# Patient Record
(demographics unavailable — no encounter records)

---

## 2024-10-16 NOTE — PHYSICAL EXAM
[No Acute Distress] : no acute distress [Well-Appearing] : well-appearing [Normal Rate] : normal rate  [Regular Rhythm] : with a regular rhythm [Soft] : abdomen soft [No Masses] : no abdominal mass palpated [Normal Bowel Sounds] : normal bowel sounds [de-identified] : RLQ tenderness w/deep palpation, some rebound

## 2024-10-16 NOTE — ASSESSMENT
[FreeTextEntry1] : 73F for acute visit for RLQ pain, hemodynacially stable, exam with +BS x4, mild rebound at RLQ, possible appendix related. Discussed abd US vs CTAP ordered w/contrast. Pt will go to [facility] for ct  If worsening pain, fevers, chills, intractable vomiting, etc, pt will go to ER for eval.  Low Segment Transverse C/S

## 2024-10-16 NOTE — HISTORY OF PRESENT ILLNESS
[FreeTextEntry8] : 73F w/anxiety, gerd, pulm nodule, htn here for acute visit.  Reports pasta, smoked salmon at lunch, but felt a little discomfort in RLQ.  abdominal pain started mid abdomen, throbbing pain, passing very little gas.  Last night very little BM, this morning pain more on RLQ.  No fevers, chills, diarrhea, no nausea, or vomiting. but low appetite. No breakfast today. Spoke to her GI doctor this morning who thought it could be appendix related.

## 2025-06-20 NOTE — REVIEW OF SYSTEMS
Gaby Larsen PA-C to Neurology Multiple Sclerosis Team 3     6/19/25  4:13 PM  Can let patient know addendum received and there has not been any change since 2023 exam, which is great.   [Abdominal Pain] : abdominal pain [Negative] : Heme/Lymph [Nausea] : no nausea [Constipation] : no constipation [Vomiting] : no vomiting [Melena] : no melena